# Patient Record
Sex: FEMALE
[De-identification: names, ages, dates, MRNs, and addresses within clinical notes are randomized per-mention and may not be internally consistent; named-entity substitution may affect disease eponyms.]

---

## 2021-04-10 ENCOUNTER — NURSE TRIAGE (OUTPATIENT)
Dept: OTHER | Facility: CLINIC | Age: 61
End: 2021-04-10

## 2021-04-10 NOTE — TELEPHONE ENCOUNTER
Reason for Disposition   Rash looks like large or small blisters (i.e., fluid filled bubbles or sacs on the skin)    Answer Assessment - Initial Assessment Questions  1. APPEARANCE of RASH: \"Describe the rash. \" (e.g., spots, blisters, raised areas, skin peeling, scaly)      Blister itchy rash   2. SIZE: \"How big are the spots? \" (e.g., tip of pen, eraser, coin; inches, centimeters)      Eraser size    3. LOCATION: \"Where is the rash located? \"      bilat forearms, chin chest and thigh    4. COLOR: \"What color is the rash? \" (Note: It is difficult to assess rash color in people with darker-colored skin. When this situation occurs, simply ask the caller to describe what they see.)     Red    5. ONSET: \"When did the rash begin? \"      4 days ago    6. FEVER: \"Do you have a fever? \" If so, ask: \"What is your temperature, how was it measured, and when did it start? \"      nop    7. ITCHING: \"Does the rash itch? \" If so, ask: \"How bad is the itch? \" (Scale 1-10; or mild, moderate, severe)      Mild    8. CAUSE: \"What do you think is causing the rash? \"      Gardening with palms    9. MEDICATION FACTORS: \"Have you started any new medications within the last 2 weeks? \" (e.g., antibiotics)       No    10. OTHER SYMPTOMS: \"Do you have any other symptoms? \" (e.g., dizziness, headache, sore throat, joint pain)        Hand joints painful but resolved    11. PREGNANCY: \"Is there any chance you are pregnant? \" \"When was your last menstrual period? \"        no    Protocols used: RASH OR REDNESS - WIDESPREAD-ADULT-AH    Brief description of triage: Pruritic rash with small water filled blisters x 4 days after gardening with palms. Rash is on bilat forearms, chin, chest and one thigh. Described as \"mild itching\" improved with OTC benadryl. Wants advice for other home treatments. Denies sx of angioedema or other serious complications. Triage indicates for patient to go to ED now. Pt does not feel that this is necessary.  This writer explained the reasons for dispo but pt does not want to go. Told to go to ED for worsening condition. Caller indicated understanding. Given info re: home treatment for pruritic rash: light clothing, no hot showers/baths, was areas of rash with hand soap to remove any remaining plant oils, OTC hydrocortisone cream, OTC benadryl, calamine lotion if it helps, oatmeal baths. Care advice provided, patient verbalizes understanding; denies any other questions or concerns; instructed to call back for any new or worsening symptoms. This triage is a result of a call to [address] [place] [address]. Please do not respond to the triage nurse through this encounter. Any subsequent communication should be directly with the patient.